# Patient Record
Sex: FEMALE | Race: WHITE | NOT HISPANIC OR LATINO | URBAN - METROPOLITAN AREA
[De-identification: names, ages, dates, MRNs, and addresses within clinical notes are randomized per-mention and may not be internally consistent; named-entity substitution may affect disease eponyms.]

---

## 2017-12-24 ENCOUNTER — OFFICE VISIT (OUTPATIENT)
Dept: URGENT CARE | Facility: CLINIC | Age: 47
End: 2017-12-24
Payer: COMMERCIAL

## 2017-12-24 VITALS
TEMPERATURE: 99 F | HEART RATE: 70 BPM | HEIGHT: 61 IN | SYSTOLIC BLOOD PRESSURE: 112 MMHG | BODY MASS INDEX: 24.44 KG/M2 | RESPIRATION RATE: 16 BRPM | OXYGEN SATURATION: 99 % | DIASTOLIC BLOOD PRESSURE: 76 MMHG | WEIGHT: 129.44 LBS

## 2017-12-24 DIAGNOSIS — K11.1 PAROTID GLAND ENLARGEMENT: Primary | ICD-10-CM

## 2017-12-24 DIAGNOSIS — K11.8 PAROTID GLAND PAIN: ICD-10-CM

## 2017-12-24 DIAGNOSIS — B37.9 YEAST INFECTION: ICD-10-CM

## 2017-12-24 PROCEDURE — 99213 OFFICE O/P EST LOW 20 MIN: CPT | Mod: S$GLB,,, | Performed by: FAMILY MEDICINE

## 2017-12-24 PROCEDURE — 99999 PR PBB SHADOW E&M-EST. PATIENT-LVL III: CPT | Mod: PBBFAC,,, | Performed by: FAMILY MEDICINE

## 2017-12-24 RX ORDER — FLUCONAZOLE 150 MG/1
150 TABLET ORAL DAILY
Qty: 1 TABLET | Refills: 0 | Status: SHIPPED | OUTPATIENT
Start: 2017-12-24 | End: 2017-12-25

## 2017-12-24 RX ORDER — CLINDAMYCIN HYDROCHLORIDE 300 MG/1
300 CAPSULE ORAL EVERY 8 HOURS
Qty: 30 CAPSULE | Refills: 0 | Status: SHIPPED | OUTPATIENT
Start: 2017-12-24

## 2017-12-24 RX ORDER — PREDNISONE 5 MG/1
5 TABLET ORAL DAILY
Qty: 10 TABLET | Refills: 0 | Status: SHIPPED | OUTPATIENT
Start: 2017-12-24 | End: 2018-01-03

## 2017-12-24 NOTE — PROGRESS NOTES
Subjective:       Patient ID: Joselin Lackey is a 47 y.o. female.    Chief Complaint: Facial Swelling (ear to jaw)    HPI Ms Lackey presents today for facial swelling on the right. She went on a bike ride this morning came back felt face swollen on the right side. Noise is funny in ear and having some popping. (1 hour ago)  Difficult to talk and chew  She denies fever  Concerned because a friend had something similar in her neck and had a stroke.     Review of Systems    Pertinent ROS listed in HPI    Objective:        Physical Exam   Constitutional: She appears well-developed and well-nourished.   HENT:   Head: Normocephalic and atraumatic.       Right Ear: External ear normal.   Left Ear: External ear normal.   Neck: Normal range of motion.   Vitals reviewed.        Assessment/Plan:     Parotid gland enlargement  -     clindamycin (CLEOCIN) 300 MG capsule; Take 1 capsule (300 mg total) by mouth every 8 (eight) hours.  Dispense: 30 capsule; Refill: 0  -     predniSONE (DELTASONE) 5 MG tablet; Take 1 tablet (5 mg total) by mouth once daily.  Dispense: 10 tablet; Refill: 0    Parotid gland pain  -     predniSONE (DELTASONE) 5 MG tablet; Take 1 tablet (5 mg total) by mouth once daily.  Dispense: 10 tablet; Refill: 0    Yeast infection  -     fluconazole (DIFLUCAN) 150 MG Tab; Take 1 tablet (150 mg total) by mouth once daily.  Dispense: 1 tablet; Refill: 0      Diiscussed possible reasons for swollen parotid gland. Patient to call is symptoms are not better in 3-4 days will consider CT scan.   Diflucan given because of history of yeast with steroid use.     Return if symptoms worsen or fail to improve.    aKcy Norwood MD  [facility]   Family Medicine

## 2018-05-14 ENCOUNTER — OUTPATIENT (OUTPATIENT)
Dept: OUTPATIENT SERVICES | Facility: HOSPITAL | Age: 48
LOS: 1 days | Discharge: HOME | End: 2018-05-14

## 2018-05-22 LAB — SURGICAL PATHOLOGY STUDY: SIGNIFICANT CHANGE UP
